# Patient Record
Sex: FEMALE | Race: WHITE | Employment: UNEMPLOYED | URBAN - METROPOLITAN AREA
[De-identification: names, ages, dates, MRNs, and addresses within clinical notes are randomized per-mention and may not be internally consistent; named-entity substitution may affect disease eponyms.]

---

## 2024-10-16 ENCOUNTER — OFFICE VISIT (OUTPATIENT)
Age: 39
End: 2024-10-16

## 2024-10-16 VITALS
BODY MASS INDEX: 24.14 KG/M2 | HEART RATE: 64 BPM | DIASTOLIC BLOOD PRESSURE: 78 MMHG | WEIGHT: 132 LBS | SYSTOLIC BLOOD PRESSURE: 132 MMHG | OXYGEN SATURATION: 99 % | TEMPERATURE: 98.1 F

## 2024-10-16 DIAGNOSIS — S60.211A CONTUSION OF RIGHT WRIST, INITIAL ENCOUNTER: Primary | ICD-10-CM

## 2024-10-16 ASSESSMENT — ENCOUNTER SYMPTOMS
SHORTNESS OF BREATH: 0
VOMITING: 0
DIARRHEA: 0
NAUSEA: 0
COUGH: 0
ABDOMINAL PAIN: 0
CONSTIPATION: 0
CHEST TIGHTNESS: 0

## 2024-10-17 NOTE — PROGRESS NOTES
Radha Pyle (:  1985) is a 39 y.o. female,New patient, here for evaluation of the following chief complaint(s):  Wrist Pain (Rt wrist, symptoms eight days ago.)      ASSESSMENT/PLAN:    ICD-10-CM    1. Contusion of right wrist, initial encounter  S60.211A [facility] [employer] Wrist Brace, Right          Patient presents for right wrist pain. She does have tenderness to dorsal aspect of the wrist.   No gross obvious deformity noted.  Patient placed in a wrist brace  Patient is pregnant; so she is advised to continue acetaminophen for symptoms.   Patient is aware that we do not have xray at this time and she has no known injury to the wrist; did not order outpatient xray at this time. Patient is agreeable with plan. Patient is given strict ED precautions, including if symptoms worsen.   DDX: carpal tunnel vs ulnar nerve entrapment      SUBJECTIVE/OBJECTIVE:    History provided by:  Patient   used: No    Wrist Pain   Pertinent negatives include no fever.     HPI:   39 y.o. female presents with symptoms of right wrist pain ongoing since 8 days. She states that she has had pain with  strength and holding her wrist up. She states that this has happened before and it spontaneously resolved. She is pregnant she is 23. She is r hand dominant. No surgeries to the right wrist. She states that she does have pain she states that she has limited range of motion with it. She does states that she think she slept on her arm a couple of weeks ago and this has been causing pain.           Vitals:    10/16/24 1957   BP: 132/78   Site: Right Upper Arm   Position: Sitting   Cuff Size: Medium Adult   Pulse: 64   Temp: 98.1 °F (36.7 °C)   TempSrc: Oral   SpO2: 99%   Weight: 59.9 kg (132 lb)       Review of Systems   Constitutional:  Negative for fatigue and fever.   Respiratory:  Negative for cough, chest tightness and shortness of breath.    Cardiovascular:  Negative for chest pain.

## 2025-04-30 ENCOUNTER — OFFICE VISIT (OUTPATIENT)
Age: 40
End: 2025-04-30

## 2025-04-30 VITALS
SYSTOLIC BLOOD PRESSURE: 117 MMHG | TEMPERATURE: 98.2 F | OXYGEN SATURATION: 99 % | HEIGHT: 62 IN | HEART RATE: 100 BPM | DIASTOLIC BLOOD PRESSURE: 44 MMHG | WEIGHT: 117 LBS | BODY MASS INDEX: 21.53 KG/M2

## 2025-04-30 DIAGNOSIS — K05.219 GINGIVAL ABSCESS: Primary | ICD-10-CM

## 2025-04-30 RX ORDER — PENICILLIN V POTASSIUM 500 MG/1
500 TABLET, FILM COATED ORAL 4 TIMES DAILY
Qty: 28 TABLET | Refills: 0 | Status: SHIPPED | OUTPATIENT
Start: 2025-04-30 | End: 2025-05-07

## 2025-04-30 ASSESSMENT — ENCOUNTER SYMPTOMS
NAUSEA: 0
SHORTNESS OF BREATH: 0
CHEST TIGHTNESS: 0
DIARRHEA: 0
COUGH: 0
CONSTIPATION: 0
ABDOMINAL PAIN: 0
VOMITING: 0

## 2025-04-30 NOTE — PATIENT INSTRUCTIONS
Please take medication as prescribed  Please follow up with dentist  Please go to ED if your symptoms worsen

## 2025-04-30 NOTE — PROGRESS NOTES
Radha Pyle (:  1985) is a 40 y.o. female,Established patient, here for evaluation of the following chief complaint(s):  Dental Pain (Pt states poss abscess, states she has an azul in 1 month x 4 days )      ASSESSMENT/PLAN:    ICD-10-CM    1. Gingival abscess  K05.219 penicillin v potassium (VEETID) 500 MG tablet          Patient presents for dental pain.  On exam To the right upper tooth patient has gingival abscess noted.  Multiple dental caries noted.  No significant mass swelling noted.  I did discuss with the patient after shared decision making we will prescribe her PCN VK.  However given how tender her gums are if her symptoms worsen or she develops fevers she should go to the ED immediately   Please take medication as prescribed  Please follow up with dentist  Please go to ED if your symptoms worsen    SUBJECTIVE/OBJECTIVE:    History provided by:  Patient   used: No    Dental Pain   Pertinent negatives include no fever.     HPI:   40 y.o. female presents with symptoms of dental pain ongoing for 4 days.   States that she has appointment with the dentist in 1 month.  She has not taken medications for symptoms.  No fevers.  No aggravating or alleviating factors.      Vitals:    25 1730   BP: (!) 117/44   BP Site: Right Upper Arm   Patient Position: Sitting   BP Cuff Size: Medium Adult   Pulse: 100   Temp: 98.2 °F (36.8 °C)   TempSrc: Oral   SpO2: 99%   Weight: 53.1 kg (117 lb)   Height: 1.575 m (5' 2\")       Review of Systems   Constitutional:  Negative for fatigue and fever.   HENT:  Positive for dental problem.    Respiratory:  Negative for cough, chest tightness and shortness of breath.    Cardiovascular:  Negative for chest pain.   Gastrointestinal:  Negative for abdominal pain, constipation, diarrhea, nausea and vomiting.   Musculoskeletal:  Negative for neck pain and neck stiffness.   Skin:  Negative for rash.       Physical Exam  Vitals and nursing note